# Patient Record
Sex: MALE | Race: WHITE | NOT HISPANIC OR LATINO | Employment: FULL TIME | URBAN - METROPOLITAN AREA
[De-identification: names, ages, dates, MRNs, and addresses within clinical notes are randomized per-mention and may not be internally consistent; named-entity substitution may affect disease eponyms.]

---

## 2023-03-31 DIAGNOSIS — K21.00 GASTRO-ESOPHAGEAL REFLUX DISEASE WITH ESOPHAGITIS, WITHOUT BLEEDING: ICD-10-CM

## 2023-03-31 RX ORDER — DEXLANSOPRAZOLE 60 MG/1
CAPSULE, DELAYED RELEASE ORAL
Qty: 90 CAPSULE | Refills: 1 | Status: SHIPPED | OUTPATIENT
Start: 2023-03-31 | End: 2023-08-23

## 2023-07-24 DIAGNOSIS — K21.9 GASTROESOPHAGEAL REFLUX DISEASE WITHOUT ESOPHAGITIS: Primary | ICD-10-CM

## 2023-07-24 RX ORDER — PANTOPRAZOLE SODIUM 40 MG/1
40 TABLET, DELAYED RELEASE ORAL DAILY
Qty: 30 TABLET | Refills: 1 | Status: SHIPPED | OUTPATIENT
Start: 2023-07-24 | End: 2023-08-23 | Stop reason: SDUPTHER

## 2023-08-21 PROBLEM — R07.89 ATYPICAL CHEST PAIN: Status: ACTIVE | Noted: 2023-08-21

## 2023-08-21 PROBLEM — K20.90 ACUTE ESOPHAGITIS: Status: ACTIVE | Noted: 2023-08-21

## 2023-08-21 PROBLEM — G56.02 CARPAL TUNNEL SYNDROME OF LEFT WRIST: Status: ACTIVE | Noted: 2023-08-21

## 2023-08-21 PROBLEM — E55.9 VITAMIN D DEFICIENCY: Status: ACTIVE | Noted: 2023-08-21

## 2023-08-21 PROBLEM — D51.0 PERNICIOUS ANEMIA: Status: ACTIVE | Noted: 2023-08-21

## 2023-08-21 PROBLEM — R73.09 ABNORMAL GLUCOSE: Status: ACTIVE | Noted: 2023-08-21

## 2023-08-21 PROBLEM — I27.20 PULMONARY HYPERTENSION (MULTI): Status: ACTIVE | Noted: 2023-08-21

## 2023-08-21 PROBLEM — E87.5 HYPERKALEMIA: Status: ACTIVE | Noted: 2023-08-21

## 2023-08-21 PROBLEM — I50.30 DIASTOLIC HEART FAILURE (MULTI): Status: ACTIVE | Noted: 2023-08-21

## 2023-08-21 PROBLEM — R26.81 UNSTEADY GAIT: Status: ACTIVE | Noted: 2023-08-21

## 2023-08-21 PROBLEM — K21.00 GASTROESOPHAGEAL REFLUX DISEASE WITH ESOPHAGITIS: Status: ACTIVE | Noted: 2023-08-21

## 2023-08-21 PROBLEM — M51.26 LUMBAR DISC HERNIATION: Status: ACTIVE | Noted: 2023-08-21

## 2023-08-21 PROBLEM — R59.1 LYMPHADENOPATHY: Status: ACTIVE | Noted: 2023-08-21

## 2023-08-21 PROBLEM — R60.0 BILATERAL LEG EDEMA: Status: ACTIVE | Noted: 2023-08-21

## 2023-08-21 PROBLEM — M51.36 DDD (DEGENERATIVE DISC DISEASE), LUMBAR: Status: ACTIVE | Noted: 2023-08-21

## 2023-08-21 PROBLEM — J43.9 PULMONARY EMPHYSEMA (MULTI): Status: ACTIVE | Noted: 2023-08-21

## 2023-08-21 PROBLEM — R29.898 WEAKNESS OF RIGHT LEG: Status: ACTIVE | Noted: 2023-08-21

## 2023-08-21 PROBLEM — S29.011A STRAIN OF RIGHT PECTORALIS MUSCLE: Status: ACTIVE | Noted: 2023-08-21

## 2023-08-21 PROBLEM — M10.9 GOUT: Status: ACTIVE | Noted: 2023-08-21

## 2023-08-21 PROBLEM — K40.20 BILATERAL INGUINAL HERNIA: Status: ACTIVE | Noted: 2023-08-21

## 2023-08-21 PROBLEM — G47.30 SLEEP-DISORDERED BREATHING: Status: ACTIVE | Noted: 2023-08-21

## 2023-08-21 PROBLEM — M51.369 DDD (DEGENERATIVE DISC DISEASE), LUMBAR: Status: ACTIVE | Noted: 2023-08-21

## 2023-08-21 PROBLEM — I10 BENIGN ESSENTIAL HYPERTENSION: Status: ACTIVE | Noted: 2023-08-21

## 2023-08-21 PROBLEM — E78.2 MIXED HYPERLIPIDEMIA: Status: ACTIVE | Noted: 2023-08-21

## 2023-08-21 PROBLEM — R73.9 HYPERGLYCEMIA: Status: ACTIVE | Noted: 2023-08-21

## 2023-08-21 RX ORDER — ALBUTEROL SULFATE 90 UG/1
1 AEROSOL, METERED RESPIRATORY (INHALATION) EVERY 4 HOURS PRN
COMMUNITY
Start: 2019-03-20

## 2023-08-21 RX ORDER — ALLOPURINOL 100 MG/1
1 TABLET ORAL DAILY
COMMUNITY
Start: 2022-09-14 | End: 2023-08-23 | Stop reason: ALTCHOICE

## 2023-08-21 RX ORDER — ATORVASTATIN CALCIUM 40 MG/1
40 TABLET, FILM COATED ORAL DAILY
COMMUNITY
End: 2023-08-23 | Stop reason: SDUPTHER

## 2023-08-21 RX ORDER — BUDESONIDE AND FORMOTEROL FUMARATE DIHYDRATE 80; 4.5 UG/1; UG/1
2 AEROSOL RESPIRATORY (INHALATION) 2 TIMES DAILY
COMMUNITY
Start: 2019-03-20 | End: 2023-12-05

## 2023-08-23 ENCOUNTER — OFFICE VISIT (OUTPATIENT)
Dept: PRIMARY CARE | Facility: CLINIC | Age: 58
End: 2023-08-23
Payer: COMMERCIAL

## 2023-08-23 VITALS
RESPIRATION RATE: 16 BRPM | BODY MASS INDEX: 27.62 KG/M2 | TEMPERATURE: 98.2 F | OXYGEN SATURATION: 96 % | HEART RATE: 67 BPM | DIASTOLIC BLOOD PRESSURE: 64 MMHG | HEIGHT: 67 IN | SYSTOLIC BLOOD PRESSURE: 130 MMHG | WEIGHT: 176 LBS

## 2023-08-23 DIAGNOSIS — I10 BENIGN ESSENTIAL HYPERTENSION: ICD-10-CM

## 2023-08-23 DIAGNOSIS — M1A.0710 IDIOPATHIC CHRONIC GOUT OF RIGHT ANKLE WITHOUT TOPHUS: ICD-10-CM

## 2023-08-23 DIAGNOSIS — K21.9 GASTROESOPHAGEAL REFLUX DISEASE WITHOUT ESOPHAGITIS: ICD-10-CM

## 2023-08-23 DIAGNOSIS — R73.03 PRE-DIABETES: ICD-10-CM

## 2023-08-23 DIAGNOSIS — Z12.11 COLON CANCER SCREENING: ICD-10-CM

## 2023-08-23 DIAGNOSIS — Z00.00 ANNUAL PHYSICAL EXAM: ICD-10-CM

## 2023-08-23 DIAGNOSIS — K21.00 GASTROESOPHAGEAL REFLUX DISEASE WITH ESOPHAGITIS WITHOUT HEMORRHAGE: ICD-10-CM

## 2023-08-23 DIAGNOSIS — E78.2 MIXED HYPERLIPIDEMIA: ICD-10-CM

## 2023-08-23 DIAGNOSIS — I50.32 CHRONIC DIASTOLIC HEART FAILURE (MULTI): ICD-10-CM

## 2023-08-23 DIAGNOSIS — J43.1 PANLOBULAR EMPHYSEMA (MULTI): ICD-10-CM

## 2023-08-23 DIAGNOSIS — Z23 NEED FOR VACCINATION: ICD-10-CM

## 2023-08-23 PROBLEM — K20.90 ACUTE ESOPHAGITIS: Status: RESOLVED | Noted: 2023-08-21 | Resolved: 2023-08-23

## 2023-08-23 PROBLEM — R59.1 LYMPHADENOPATHY: Status: RESOLVED | Noted: 2023-08-21 | Resolved: 2023-08-23

## 2023-08-23 PROBLEM — R29.898 WEAKNESS OF RIGHT LEG: Status: RESOLVED | Noted: 2023-08-21 | Resolved: 2023-08-23

## 2023-08-23 PROBLEM — R73.9 HYPERGLYCEMIA: Status: RESOLVED | Noted: 2023-08-21 | Resolved: 2023-08-23

## 2023-08-23 PROBLEM — R73.09 ABNORMAL GLUCOSE: Status: RESOLVED | Noted: 2023-08-21 | Resolved: 2023-08-23

## 2023-08-23 PROBLEM — R07.89 ATYPICAL CHEST PAIN: Status: RESOLVED | Noted: 2023-08-21 | Resolved: 2023-08-23

## 2023-08-23 PROBLEM — R60.0 BILATERAL LEG EDEMA: Status: RESOLVED | Noted: 2023-08-21 | Resolved: 2023-08-23

## 2023-08-23 PROBLEM — E87.5 HYPERKALEMIA: Status: RESOLVED | Noted: 2023-08-21 | Resolved: 2023-08-23

## 2023-08-23 PROBLEM — R26.81 UNSTEADY GAIT: Status: RESOLVED | Noted: 2023-08-21 | Resolved: 2023-08-23

## 2023-08-23 PROBLEM — G56.02 CARPAL TUNNEL SYNDROME OF LEFT WRIST: Status: RESOLVED | Noted: 2023-08-21 | Resolved: 2023-08-23

## 2023-08-23 PROBLEM — S29.011A STRAIN OF RIGHT PECTORALIS MUSCLE: Status: RESOLVED | Noted: 2023-08-21 | Resolved: 2023-08-23

## 2023-08-23 PROCEDURE — 90677 PCV20 VACCINE IM: CPT | Performed by: FAMILY MEDICINE

## 2023-08-23 PROCEDURE — 90471 IMMUNIZATION ADMIN: CPT | Performed by: FAMILY MEDICINE

## 2023-08-23 PROCEDURE — 3078F DIAST BP <80 MM HG: CPT | Performed by: FAMILY MEDICINE

## 2023-08-23 PROCEDURE — 99214 OFFICE O/P EST MOD 30 MIN: CPT | Performed by: FAMILY MEDICINE

## 2023-08-23 PROCEDURE — 1036F TOBACCO NON-USER: CPT | Performed by: FAMILY MEDICINE

## 2023-08-23 PROCEDURE — 99396 PREV VISIT EST AGE 40-64: CPT | Performed by: FAMILY MEDICINE

## 2023-08-23 PROCEDURE — 94010 BREATHING CAPACITY TEST: CPT | Performed by: FAMILY MEDICINE

## 2023-08-23 PROCEDURE — 3075F SYST BP GE 130 - 139MM HG: CPT | Performed by: FAMILY MEDICINE

## 2023-08-23 RX ORDER — ATORVASTATIN CALCIUM 40 MG/1
40 TABLET, FILM COATED ORAL DAILY
Qty: 90 TABLET | Refills: 1 | Status: SHIPPED | OUTPATIENT
Start: 2023-08-23

## 2023-08-23 RX ORDER — PANTOPRAZOLE SODIUM 40 MG/1
40 TABLET, DELAYED RELEASE ORAL DAILY
Qty: 30 TABLET | Refills: 1 | Status: SHIPPED | OUTPATIENT
Start: 2023-08-23 | End: 2023-12-05

## 2023-08-23 RX ORDER — ALLOPURINOL 100 MG/1
100 TABLET ORAL DAILY
Qty: 30 TABLET | Refills: 1 | Status: SHIPPED | OUTPATIENT
Start: 2023-08-23 | End: 2023-11-03

## 2023-08-23 ASSESSMENT — PATIENT HEALTH QUESTIONNAIRE - PHQ9
8. MOVING OR SPEAKING SO SLOWLY THAT OTHER PEOPLE COULD HAVE NOTICED. OR THE OPPOSITE, BEING SO FIGETY OR RESTLESS THAT YOU HAVE BEEN MOVING AROUND A LOT MORE THAN USUAL: NOT AT ALL
2. FEELING DOWN, DEPRESSED OR HOPELESS: NOT AT ALL
SUM OF ALL RESPONSES TO PHQ9 QUESTIONS 1 AND 2: 0
6. FEELING BAD ABOUT YOURSELF - OR THAT YOU ARE A FAILURE OR HAVE LET YOURSELF OR YOUR FAMILY DOWN: NOT AT ALL
5. POOR APPETITE OR OVEREATING: NOT AT ALL
4. FEELING TIRED OR HAVING LITTLE ENERGY: MORE THAN HALF THE DAYS
10. IF YOU CHECKED OFF ANY PROBLEMS, HOW DIFFICULT HAVE THESE PROBLEMS MADE IT FOR YOU TO DO YOUR WORK, TAKE CARE OF THINGS AT HOME, OR GET ALONG WITH OTHER PEOPLE: NOT DIFFICULT AT ALL
3. TROUBLE FALLING OR STAYING ASLEEP OR SLEEPING TOO MUCH: NOT AT ALL
7. TROUBLE CONCENTRATING ON THINGS, SUCH AS READING THE NEWSPAPER OR WATCHING TELEVISION: NOT AT ALL
9. THOUGHTS THAT YOU WOULD BE BETTER OFF DEAD, OR OF HURTING YOURSELF: NOT AT ALL
SUM OF ALL RESPONSES TO PHQ QUESTIONS 1-9: 2
1. LITTLE INTEREST OR PLEASURE IN DOING THINGS: NOT AT ALL

## 2023-08-23 NOTE — ASSESSMENT & PLAN NOTE
Stable.  Normal spirometry.  Continue Symbicort twice daily and as needed use of albuterol.  Recommending referral back to pulmonology, patient declined

## 2023-08-23 NOTE — ASSESSMENT & PLAN NOTE
Stable without meds.  Patient declined medication at this time.  Begin home blood pressure monitoring.  Referring back to cardiology.  Recheck in 3 months

## 2023-08-23 NOTE — ASSESSMENT & PLAN NOTE
Schedule fasting labs.  Checking PSA.  Schedule colonoscopy.  Increase physical activity.  Follow a Mediterranean diet.  Update pneumonia vaccine.  Declined tetanus

## 2023-08-23 NOTE — ASSESSMENT & PLAN NOTE
Stable without meds?   Schedule echocardiogram   Referring back to cardiology for further assessment.

## 2023-08-23 NOTE — PROGRESS NOTES
"Subjective   Patient ID: Russel Black is a 57 y.o. male who presents for Annual Exam.    Here for CPE    Has been on vacation at home this week, fixing up his house getting maintenance completed.     Feeling well in general  Wakes frequently at night, several times to go the BR  Does not seem abnormal, denies urine issues, pain, burning or urgency.  Has been for years.  Breathing is fair, using MDI symbicort daily  Still getting SOB with ambulating long distances  Working at [employer], shipping and moving packages.     Has not seen his cardiologist in several years  Due for colonoscopy and GI visit, declines follow up    Due for tdap and pneumonia vaccines               Review of Systems    Objective   /64   Pulse 67   Temp 36.8 °C (98.2 °F)   Resp 16   Ht 1.689 m (5' 6.5\")   Wt 79.8 kg (176 lb)   SpO2 96%   BMI 27.98 kg/m²     Physical Exam  Vitals and nursing note reviewed.   Constitutional:       Appearance: Normal appearance.   HENT:      Head: Normocephalic.      Right Ear: Tympanic membrane and ear canal normal.      Left Ear: Tympanic membrane and ear canal normal.      Nose: Nose normal.      Mouth/Throat:      Mouth: Mucous membranes are moist.   Eyes:      Extraocular Movements: Extraocular movements intact.      Conjunctiva/sclera: Conjunctivae normal.      Pupils: Pupils are equal, round, and reactive to light.   Cardiovascular:      Rate and Rhythm: Normal rate and regular rhythm.      Pulses: Normal pulses.      Heart sounds: Normal heart sounds.   Pulmonary:      Effort: Pulmonary effort is normal.      Breath sounds: Normal breath sounds.   Musculoskeletal:         General: Normal range of motion.      Cervical back: Normal range of motion.   Skin:     General: Skin is warm and dry.   Neurological:      General: No focal deficit present.      Mental Status: He is alert and oriented to person, place, and time.   Psychiatric:         Mood and Affect: Mood normal.         Behavior: " Behavior normal.         Thought Content: Thought content normal.         Judgment: Judgment normal.         Assessment/Plan   Problem List Items Addressed This Visit       Benign essential hypertension     Stable without meds.  Patient declined medication at this time.  Begin home blood pressure monitoring.  Referring back to cardiology.  Recheck in 3 months         Diastolic heart failure (CMS/HCC)     Stable without meds?   Schedule echocardiogram   Referring back to cardiology for further assessment.           Relevant Orders    Referral to Cardiology    Transthoracic Echo (TTE) Complete    Gastroesophageal reflux disease with esophagitis     Stable on present dose of meds.  Referring back to GI for continued assessment and colonoscopy         Gout     Stable with as needed use of medication.  Continue to monitor         Relevant Medications    allopurinol (Zyloprim) 100 mg tablet    Mixed hyperlipidemia     Checking fasting lipid profile and adjust meds accordingly.  Reviewed diet.  Recheck in 3 months         Relevant Medications    atorvastatin (Lipitor) 40 mg tablet    Pulmonary emphysema (CMS/HCC)     Stable.  Normal spirometry.  Continue Symbicort twice daily and as needed use of albuterol.  Recommending referral back to pulmonology, patient declined         Relevant Orders    Pulmonary function testing (Clinic Performed)    Breathing capacity test    Pre-diabetes     Uncontrolled  Checking fasting sugar and treat accordingly         Annual physical exam - Primary     Schedule fasting labs.  Checking PSA.  Schedule colonoscopy.  Increase physical activity.  Follow a Mediterranean diet.  Update pneumonia vaccine.  Declined tetanus           Relevant Orders    CBC and Auto Differential    Comprehensive Metabolic Panel    Lipid Panel    Prostate Specific Antigen, Screen    Pneumococcal conjugate vaccine, 20-valent, adult (PREVNAR 20) (Completed)     Other Visit Diagnoses       Colon cancer screening         Relevant Orders    Colonoscopy Screening    Gastroesophageal reflux disease without esophagitis        Relevant Medications    pantoprazole (ProtoNix) 40 mg EC tablet    Need for vaccination        Relevant Orders    Pneumococcal conjugate vaccine, 20-valent, adult (PREVNAR 20) (Completed)

## 2023-08-28 ENCOUNTER — LAB (OUTPATIENT)
Dept: LAB | Facility: LAB | Age: 58
End: 2023-08-28
Payer: COMMERCIAL

## 2023-08-28 DIAGNOSIS — Z00.00 ANNUAL PHYSICAL EXAM: ICD-10-CM

## 2023-08-28 LAB
ALANINE AMINOTRANSFERASE (SGPT) (U/L) IN SER/PLAS: 17 U/L (ref 10–52)
ALBUMIN (G/DL) IN SER/PLAS: 4.5 G/DL (ref 3.4–5)
ALKALINE PHOSPHATASE (U/L) IN SER/PLAS: 65 U/L (ref 33–120)
ANION GAP IN SER/PLAS: 14 MMOL/L (ref 10–20)
ASPARTATE AMINOTRANSFERASE (SGOT) (U/L) IN SER/PLAS: 16 U/L (ref 9–39)
BASOPHILS (10*3/UL) IN BLOOD BY AUTOMATED COUNT: 0.06 X10E9/L (ref 0–0.1)
BASOPHILS/100 LEUKOCYTES IN BLOOD BY AUTOMATED COUNT: 0.8 % (ref 0–2)
BILIRUBIN TOTAL (MG/DL) IN SER/PLAS: 0.7 MG/DL (ref 0–1.2)
CALCIUM (MG/DL) IN SER/PLAS: 9.6 MG/DL (ref 8.6–10.6)
CARBON DIOXIDE, TOTAL (MMOL/L) IN SER/PLAS: 25 MMOL/L (ref 21–32)
CHLORIDE (MMOL/L) IN SER/PLAS: 106 MMOL/L (ref 98–107)
CHOLESTEROL (MG/DL) IN SER/PLAS: 180 MG/DL (ref 0–199)
CHOLESTEROL IN HDL (MG/DL) IN SER/PLAS: 46.8 MG/DL
CHOLESTEROL/HDL RATIO: 3.8
CREATININE (MG/DL) IN SER/PLAS: 1 MG/DL (ref 0.5–1.3)
EOSINOPHILS (10*3/UL) IN BLOOD BY AUTOMATED COUNT: 0.44 X10E9/L (ref 0–0.7)
EOSINOPHILS/100 LEUKOCYTES IN BLOOD BY AUTOMATED COUNT: 6 % (ref 0–6)
ERYTHROCYTE DISTRIBUTION WIDTH (RATIO) BY AUTOMATED COUNT: 11.8 % (ref 11.5–14.5)
ERYTHROCYTE MEAN CORPUSCULAR HEMOGLOBIN CONCENTRATION (G/DL) BY AUTOMATED: 32.7 G/DL (ref 32–36)
ERYTHROCYTE MEAN CORPUSCULAR VOLUME (FL) BY AUTOMATED COUNT: 93 FL (ref 80–100)
ERYTHROCYTES (10*6/UL) IN BLOOD BY AUTOMATED COUNT: 5.09 X10E12/L (ref 4.5–5.9)
GFR MALE: 89 ML/MIN/1.73M2
GLUCOSE (MG/DL) IN SER/PLAS: 101 MG/DL (ref 74–99)
HEMATOCRIT (%) IN BLOOD BY AUTOMATED COUNT: 47.4 % (ref 41–52)
HEMOGLOBIN (G/DL) IN BLOOD: 15.5 G/DL (ref 13.5–17.5)
IMMATURE GRANULOCYTES/100 LEUKOCYTES IN BLOOD BY AUTOMATED COUNT: 0.3 % (ref 0–0.9)
LDL: 122 MG/DL (ref 0–99)
LEUKOCYTES (10*3/UL) IN BLOOD BY AUTOMATED COUNT: 7.4 X10E9/L (ref 4.4–11.3)
LYMPHOCYTES (10*3/UL) IN BLOOD BY AUTOMATED COUNT: 1.45 X10E9/L (ref 1.2–4.8)
LYMPHOCYTES/100 LEUKOCYTES IN BLOOD BY AUTOMATED COUNT: 19.6 % (ref 13–44)
MONOCYTES (10*3/UL) IN BLOOD BY AUTOMATED COUNT: 0.59 X10E9/L (ref 0.1–1)
MONOCYTES/100 LEUKOCYTES IN BLOOD BY AUTOMATED COUNT: 8 % (ref 2–10)
NEUTROPHILS (10*3/UL) IN BLOOD BY AUTOMATED COUNT: 4.83 X10E9/L (ref 1.2–7.7)
NEUTROPHILS/100 LEUKOCYTES IN BLOOD BY AUTOMATED COUNT: 65.3 % (ref 40–80)
NRBC (PER 100 WBCS) BY AUTOMATED COUNT: 0 /100 WBC (ref 0–0)
PLATELETS (10*3/UL) IN BLOOD AUTOMATED COUNT: 262 X10E9/L (ref 150–450)
POTASSIUM (MMOL/L) IN SER/PLAS: 4.4 MMOL/L (ref 3.5–5.3)
PROSTATE SPECIFIC ANTIGEN,SCREEN: 1.07 NG/ML (ref 0–4)
PROTEIN TOTAL: 7.3 G/DL (ref 6.4–8.2)
SODIUM (MMOL/L) IN SER/PLAS: 141 MMOL/L (ref 136–145)
TRIGLYCERIDE (MG/DL) IN SER/PLAS: 56 MG/DL (ref 0–149)
UREA NITROGEN (MG/DL) IN SER/PLAS: 10 MG/DL (ref 6–23)
VLDL: 11 MG/DL (ref 0–40)

## 2023-08-28 PROCEDURE — 84153 ASSAY OF PSA TOTAL: CPT

## 2023-08-28 PROCEDURE — 85025 COMPLETE CBC W/AUTO DIFF WBC: CPT

## 2023-08-28 PROCEDURE — 80053 COMPREHEN METABOLIC PANEL: CPT

## 2023-08-28 PROCEDURE — 80061 LIPID PANEL: CPT

## 2023-08-28 PROCEDURE — 36415 COLL VENOUS BLD VENIPUNCTURE: CPT

## 2023-11-15 ENCOUNTER — APPOINTMENT (OUTPATIENT)
Dept: PRIMARY CARE | Facility: CLINIC | Age: 58
End: 2023-11-15
Payer: COMMERCIAL

## 2023-12-05 DIAGNOSIS — J43.9 EMPHYSEMA, UNSPECIFIED (MULTI): ICD-10-CM

## 2023-12-05 DIAGNOSIS — K21.9 GASTROESOPHAGEAL REFLUX DISEASE WITHOUT ESOPHAGITIS: ICD-10-CM

## 2023-12-05 RX ORDER — BUDESONIDE AND FORMOTEROL FUMARATE DIHYDRATE 80; 4.5 UG/1; UG/1
2 AEROSOL RESPIRATORY (INHALATION) 2 TIMES DAILY
Qty: 10.2 EACH | Refills: 0 | Status: SHIPPED | OUTPATIENT
Start: 2023-12-05 | End: 2024-01-02

## 2023-12-05 RX ORDER — PANTOPRAZOLE SODIUM 40 MG/1
40 TABLET, DELAYED RELEASE ORAL DAILY
Qty: 30 TABLET | Refills: 0 | Status: SHIPPED | OUTPATIENT
Start: 2023-12-05 | End: 2024-01-02

## 2024-01-01 DIAGNOSIS — M1A.0710 IDIOPATHIC CHRONIC GOUT OF RIGHT ANKLE WITHOUT TOPHUS: ICD-10-CM

## 2024-01-01 DIAGNOSIS — K21.9 GASTROESOPHAGEAL REFLUX DISEASE WITHOUT ESOPHAGITIS: ICD-10-CM

## 2024-01-01 DIAGNOSIS — J43.9 EMPHYSEMA, UNSPECIFIED (MULTI): ICD-10-CM

## 2024-01-02 RX ORDER — BUDESONIDE AND FORMOTEROL FUMARATE DIHYDRATE 80; 4.5 UG/1; UG/1
2 AEROSOL RESPIRATORY (INHALATION) 2 TIMES DAILY
Qty: 10.2 EACH | Refills: 0 | Status: SHIPPED | OUTPATIENT
Start: 2024-01-02

## 2024-01-02 RX ORDER — ALLOPURINOL 100 MG/1
100 TABLET ORAL DAILY
Qty: 30 TABLET | Refills: 0 | Status: SHIPPED | OUTPATIENT
Start: 2024-01-02

## 2024-01-02 RX ORDER — PANTOPRAZOLE SODIUM 40 MG/1
40 TABLET, DELAYED RELEASE ORAL DAILY
Qty: 30 TABLET | Refills: 0 | Status: SHIPPED | OUTPATIENT
Start: 2024-01-02

## 2024-01-29 DIAGNOSIS — J43.9 EMPHYSEMA, UNSPECIFIED (MULTI): ICD-10-CM

## 2024-01-29 DIAGNOSIS — M1A.0710 IDIOPATHIC CHRONIC GOUT OF RIGHT ANKLE WITHOUT TOPHUS: ICD-10-CM

## 2024-01-29 DIAGNOSIS — K21.9 GASTROESOPHAGEAL REFLUX DISEASE WITHOUT ESOPHAGITIS: ICD-10-CM

## 2024-01-29 RX ORDER — PANTOPRAZOLE SODIUM 40 MG/1
40 TABLET, DELAYED RELEASE ORAL DAILY
Qty: 30 TABLET | Refills: 0 | OUTPATIENT
Start: 2024-01-29

## 2024-01-29 RX ORDER — ALLOPURINOL 100 MG/1
100 TABLET ORAL DAILY
Qty: 30 TABLET | Refills: 0 | OUTPATIENT
Start: 2024-01-29

## 2024-01-29 RX ORDER — BUDESONIDE AND FORMOTEROL FUMARATE DIHYDRATE 80; 4.5 UG/1; UG/1
2 AEROSOL RESPIRATORY (INHALATION) 2 TIMES DAILY
Qty: 10.2 EACH | Refills: 0 | OUTPATIENT
Start: 2024-01-29

## 2024-02-25 DIAGNOSIS — E78.2 MIXED HYPERLIPIDEMIA: ICD-10-CM

## 2024-02-26 RX ORDER — ATORVASTATIN CALCIUM 40 MG/1
40 TABLET, FILM COATED ORAL DAILY
Qty: 90 TABLET | Refills: 1 | OUTPATIENT
Start: 2024-02-26

## 2024-06-24 ENCOUNTER — APPOINTMENT (OUTPATIENT)
Dept: RADIOLOGY | Facility: HOSPITAL | Age: 59
End: 2024-06-24
Payer: COMMERCIAL

## 2024-06-24 ENCOUNTER — HOSPITAL ENCOUNTER (EMERGENCY)
Facility: HOSPITAL | Age: 59
Discharge: HOME | End: 2024-06-24
Attending: EMERGENCY MEDICINE
Payer: COMMERCIAL

## 2024-06-24 ENCOUNTER — APPOINTMENT (OUTPATIENT)
Dept: CARDIOLOGY | Facility: HOSPITAL | Age: 59
End: 2024-06-24
Payer: COMMERCIAL

## 2024-06-24 VITALS
BODY MASS INDEX: 26.68 KG/M2 | HEIGHT: 67 IN | WEIGHT: 170 LBS | TEMPERATURE: 99 F | DIASTOLIC BLOOD PRESSURE: 82 MMHG | OXYGEN SATURATION: 97 % | HEART RATE: 88 BPM | SYSTOLIC BLOOD PRESSURE: 110 MMHG | RESPIRATION RATE: 16 BRPM

## 2024-06-24 DIAGNOSIS — U07.1 COVID: Primary | ICD-10-CM

## 2024-06-24 LAB
ANION GAP SERPL CALC-SCNC: 15 MMOL/L (ref 10–20)
BASOPHILS # BLD AUTO: 0.03 X10*3/UL (ref 0–0.1)
BASOPHILS NFR BLD AUTO: 0.2 %
BUN SERPL-MCNC: 10 MG/DL (ref 6–23)
CALCIUM SERPL-MCNC: 9.2 MG/DL (ref 8.6–10.3)
CARDIAC TROPONIN I PNL SERPL HS: <3 NG/L (ref 0–20)
CHLORIDE SERPL-SCNC: 102 MMOL/L (ref 98–107)
CO2 SERPL-SCNC: 23 MMOL/L (ref 21–32)
CREAT SERPL-MCNC: 1.14 MG/DL (ref 0.5–1.3)
EGFRCR SERPLBLD CKD-EPI 2021: 75 ML/MIN/1.73M*2
EOSINOPHIL # BLD AUTO: 0.05 X10*3/UL (ref 0–0.7)
EOSINOPHIL NFR BLD AUTO: 0.4 %
ERYTHROCYTE [DISTWIDTH] IN BLOOD BY AUTOMATED COUNT: 12.3 % (ref 11.5–14.5)
FLUAV RNA RESP QL NAA+PROBE: NOT DETECTED
FLUBV RNA RESP QL NAA+PROBE: NOT DETECTED
GLUCOSE SERPL-MCNC: 103 MG/DL (ref 74–99)
HCT VFR BLD AUTO: 44.4 % (ref 41–52)
HGB BLD-MCNC: 14.5 G/DL (ref 13.5–17.5)
IMM GRANULOCYTES # BLD AUTO: 0.04 X10*3/UL (ref 0–0.7)
IMM GRANULOCYTES NFR BLD AUTO: 0.3 % (ref 0–0.9)
LYMPHOCYTES # BLD AUTO: 1.13 X10*3/UL (ref 1.2–4.8)
LYMPHOCYTES NFR BLD AUTO: 8.5 %
MCH RBC QN AUTO: 28 PG (ref 26–34)
MCHC RBC AUTO-ENTMCNC: 32.7 G/DL (ref 32–36)
MCV RBC AUTO: 86 FL (ref 80–100)
MONOCYTES # BLD AUTO: 0.86 X10*3/UL (ref 0.1–1)
MONOCYTES NFR BLD AUTO: 6.5 %
NEUTROPHILS # BLD AUTO: 11.14 X10*3/UL (ref 1.2–7.7)
NEUTROPHILS NFR BLD AUTO: 84.1 %
NRBC BLD-RTO: 0 /100 WBCS (ref 0–0)
PLATELET # BLD AUTO: 288 X10*3/UL (ref 150–450)
POTASSIUM SERPL-SCNC: 4.1 MMOL/L (ref 3.5–5.3)
RBC # BLD AUTO: 5.17 X10*6/UL (ref 4.5–5.9)
S PYO DNA THROAT QL NAA+PROBE: NOT DETECTED
SARS-COV-2 RNA RESP QL NAA+PROBE: DETECTED
SODIUM SERPL-SCNC: 136 MMOL/L (ref 136–145)
WBC # BLD AUTO: 13.3 X10*3/UL (ref 4.4–11.3)

## 2024-06-24 PROCEDURE — 85025 COMPLETE CBC W/AUTO DIFF WBC: CPT | Performed by: EMERGENCY MEDICINE

## 2024-06-24 PROCEDURE — 87636 SARSCOV2 & INF A&B AMP PRB: CPT | Performed by: EMERGENCY MEDICINE

## 2024-06-24 PROCEDURE — 93005 ELECTROCARDIOGRAM TRACING: CPT

## 2024-06-24 PROCEDURE — 99283 EMERGENCY DEPT VISIT LOW MDM: CPT | Mod: 25

## 2024-06-24 PROCEDURE — 80048 BASIC METABOLIC PNL TOTAL CA: CPT | Performed by: EMERGENCY MEDICINE

## 2024-06-24 PROCEDURE — 87651 STREP A DNA AMP PROBE: CPT | Performed by: EMERGENCY MEDICINE

## 2024-06-24 PROCEDURE — 71046 X-RAY EXAM CHEST 2 VIEWS: CPT

## 2024-06-24 PROCEDURE — 84484 ASSAY OF TROPONIN QUANT: CPT | Performed by: EMERGENCY MEDICINE

## 2024-06-24 PROCEDURE — 71046 X-RAY EXAM CHEST 2 VIEWS: CPT | Mod: FOREIGN READ | Performed by: STUDENT IN AN ORGANIZED HEALTH CARE EDUCATION/TRAINING PROGRAM

## 2024-06-24 PROCEDURE — 2500000001 HC RX 250 WO HCPCS SELF ADMINISTERED DRUGS (ALT 637 FOR MEDICARE OP): Performed by: EMERGENCY MEDICINE

## 2024-06-24 PROCEDURE — 36415 COLL VENOUS BLD VENIPUNCTURE: CPT | Performed by: EMERGENCY MEDICINE

## 2024-06-24 RX ORDER — NIRMATRELVIR AND RITONAVIR 300-100 MG
3 KIT ORAL 2 TIMES DAILY
Qty: 30 TABLET | Refills: 0 | Status: SHIPPED | OUTPATIENT
Start: 2024-06-24 | End: 2024-06-29

## 2024-06-24 RX ORDER — TRIPROLIDINE/PSEUDOEPHEDRINE 2.5MG-60MG
400 TABLET ORAL ONCE
Status: COMPLETED | OUTPATIENT
Start: 2024-06-24 | End: 2024-06-24

## 2024-06-24 ASSESSMENT — PAIN - FUNCTIONAL ASSESSMENT: PAIN_FUNCTIONAL_ASSESSMENT: 0-10

## 2024-06-24 ASSESSMENT — COLUMBIA-SUICIDE SEVERITY RATING SCALE - C-SSRS
6. HAVE YOU EVER DONE ANYTHING, STARTED TO DO ANYTHING, OR PREPARED TO DO ANYTHING TO END YOUR LIFE?: NO
1. IN THE PAST MONTH, HAVE YOU WISHED YOU WERE DEAD OR WISHED YOU COULD GO TO SLEEP AND NOT WAKE UP?: NO
2. HAVE YOU ACTUALLY HAD ANY THOUGHTS OF KILLING YOURSELF?: NO

## 2024-06-24 ASSESSMENT — PAIN SCALES - GENERAL: PAINLEVEL_OUTOF10: 0 - NO PAIN

## 2024-06-24 NOTE — Clinical Note
Russel Black was seen and treated in our emergency department on 6/24/2024.  He may return to work on 06/29/2024.  + covid     If you have any questions or concerns, please don't hesitate to call.      Tim Gamboa MD

## 2024-06-25 LAB
ATRIAL RATE: 116 BPM
P AXIS: 27 DEGREES
P OFFSET: 194 MS
P ONSET: 156 MS
PR INTERVAL: 118 MS
Q ONSET: 215 MS
QRS COUNT: 19 BEATS
QRS DURATION: 82 MS
QT INTERVAL: 296 MS
QTC CALCULATION(BAZETT): 411 MS
QTC FREDERICIA: 369 MS
R AXIS: 2 DEGREES
T AXIS: 63 DEGREES
T OFFSET: 363 MS
VENTRICULAR RATE: 116 BPM

## 2024-06-25 NOTE — DISCHARGE INSTRUCTIONS
Please take the Paxlovid as prescribed to treat for your COVID.  Please use Tylenol and or Motrin as needed for fever and body aches.  Please stay well-hydrated.  Please turn ED for worsened difficulty breathing, productive cough, shortness of breath or any other concerning symptoms.

## 2024-06-25 NOTE — ED PROVIDER NOTES
HPI   Chief Complaint   Patient presents with   • Shortness of Breath   • Fever       This is a 58-year-old man with past medical history of of hypertension, diastolic heart failure, GERD, gout, hyperlipidemia, emphysema who does present with complaint of fever, chills, sore throat x 1 day.  No nausea or vomiting.  Positive associated cough.  No abdominal pain.  No nausea or vomiting or diarrhea.  No dysuria hematuria flank pain.  Tolerating p.o. normally.  Positive sick contacts with niece with similar symptoms.                        Krystin Coma Scale Score: 15                     Patient History   Past Medical History:   Diagnosis Date   • Carpal tunnel syndrome of left wrist 08/21/2023   • Other conditions influencing health status     Nephrolithiasis   • Personal history of other diseases of the circulatory system 10/23/2019    History of congestive heart failure   • Raynaud's syndrome without gangrene     Raynaud's disease     Past Surgical History:   Procedure Laterality Date   • CT ABDOMEN PELVIS ANGIOGRAM W AND/OR WO IV CONTRAST  9/20/2020    CT ABDOMEN PELVIS ANGIOGRAM W AND/OR WO IV CONTRAST 9/20/2020 [facility]   • LUNG LOBECTOMY  12/13/2013    Lung Lobectomy   • OTHER SURGICAL HISTORY  12/13/2013    Neuroplasty Right Thumb   • OTHER SURGICAL HISTORY  05/23/2018    Laparoscopy Repair Of Initial Inguinal Hernia     No family history on file.  Social History     Tobacco Use   • Smoking status: Never     Passive exposure: Never   • Smokeless tobacco: Never   Substance Use Topics   • Alcohol use: Not Currently   • Drug use: Not Currently       Physical Exam   ED Triage Vitals [06/24/24 1928]   Temperature Heart Rate Respirations BP   (!) 38 °C (100.4 °F) (!) 122 (!) 22 116/77      Pulse Ox Temp Source Heart Rate Source Patient Position   96 % Temporal -- --      BP Location FiO2 (%)     -- --       Physical Exam  Vitals and nursing note reviewed.   Constitutional:       Appearance: He is  well-developed and normal weight.   HENT:      Head: Normocephalic and atraumatic.      Mouth/Throat:      Mouth: Mucous membranes are moist.   Eyes:      Extraocular Movements: Extraocular movements intact.      Pupils: Pupils are equal, round, and reactive to light.   Cardiovascular:      Rate and Rhythm: Normal rate and regular rhythm.   Pulmonary:      Effort: Pulmonary effort is normal.      Breath sounds: Normal breath sounds.   Musculoskeletal:         General: Normal range of motion.      Cervical back: Normal range of motion and neck supple.   Skin:     General: Skin is warm and dry.      Capillary Refill: Capillary refill takes less than 2 seconds.   Neurological:      General: No focal deficit present.      Mental Status: He is alert and oriented to person, place, and time.   Psychiatric:         Mood and Affect: Mood normal.         Behavior: Behavior normal.         ED Course & MDM   Diagnoses as of 06/24/24 2238   COVID       Medical Decision Making  IV is placed and labs drawn including CBC, CMP, COVID, strep, troponin.  Flu a was negative.  Troponin was negative for ACS.  There was no acute kidney injury.  No metabolic and epic acidosis.  There was slightly elevated white blood cell count at 13.3.  SARS COVID was noted positive.  His chest x-ray was negative for acute pneumonia.  Following treatment with Motrin and p.o. fluids patient's vital signs do normalized.  He will be discharged home with Paxlovid given long risk factors plan to follow-up with outpatient provider.  Return precautions are discussed.    EKG interpreted by me showed normal sinus tachycardia rhythm at a rate of 116 with no acute ischemic changes.  No STEMI.  No A-fib.    Amount and/or Complexity of Data Reviewed  Labs: ordered. Decision-making details documented in ED Course.  Radiology: ordered. Decision-making details documented in ED Course.  ECG/medicine tests: ordered. Decision-making details documented in ED  Course.        Procedure  Procedures     Tim Gamboa MD  06/24/24 [id number]

## 2024-10-16 ENCOUNTER — HOSPITAL ENCOUNTER (OUTPATIENT)
Dept: RADIOLOGY | Facility: CLINIC | Age: 59
Discharge: HOME | End: 2024-10-16
Payer: COMMERCIAL

## 2024-10-16 ENCOUNTER — APPOINTMENT (OUTPATIENT)
Dept: PRIMARY CARE | Facility: CLINIC | Age: 59
End: 2024-10-16
Payer: COMMERCIAL

## 2024-10-16 ENCOUNTER — LAB (OUTPATIENT)
Dept: LAB | Facility: LAB | Age: 59
End: 2024-10-16
Payer: COMMERCIAL

## 2024-10-16 VITALS
WEIGHT: 183 LBS | HEIGHT: 67 IN | BODY MASS INDEX: 28.72 KG/M2 | DIASTOLIC BLOOD PRESSURE: 72 MMHG | HEART RATE: 79 BPM | OXYGEN SATURATION: 96 % | SYSTOLIC BLOOD PRESSURE: 106 MMHG | TEMPERATURE: 98 F

## 2024-10-16 DIAGNOSIS — I10 BENIGN ESSENTIAL HYPERTENSION: ICD-10-CM

## 2024-10-16 DIAGNOSIS — Z00.00 ANNUAL PHYSICAL EXAM: ICD-10-CM

## 2024-10-16 DIAGNOSIS — Z00.00 ANNUAL PHYSICAL EXAM: Primary | ICD-10-CM

## 2024-10-16 DIAGNOSIS — R73.03 PRE-DIABETES: ICD-10-CM

## 2024-10-16 DIAGNOSIS — F32.A DEPRESSION, UNSPECIFIED DEPRESSION TYPE: ICD-10-CM

## 2024-10-16 DIAGNOSIS — M1A.9XX0 CHRONIC GOUT WITHOUT TOPHUS, UNSPECIFIED CAUSE, UNSPECIFIED SITE: ICD-10-CM

## 2024-10-16 DIAGNOSIS — E78.2 MIXED HYPERLIPIDEMIA: ICD-10-CM

## 2024-10-16 DIAGNOSIS — I50.32 CHRONIC DIASTOLIC HEART FAILURE: ICD-10-CM

## 2024-10-16 DIAGNOSIS — I27.20 PULMONARY HYPERTENSION (MULTI): ICD-10-CM

## 2024-10-16 DIAGNOSIS — R13.12 OROPHARYNGEAL DYSPHAGIA: ICD-10-CM

## 2024-10-16 DIAGNOSIS — R06.02 SHORTNESS OF BREATH: ICD-10-CM

## 2024-10-16 DIAGNOSIS — R35.0 URINE FREQUENCY: ICD-10-CM

## 2024-10-16 DIAGNOSIS — J43.1 PANLOBULAR EMPHYSEMA (MULTI): ICD-10-CM

## 2024-10-16 DIAGNOSIS — K21.00 GASTROESOPHAGEAL REFLUX DISEASE WITH ESOPHAGITIS WITHOUT HEMORRHAGE: ICD-10-CM

## 2024-10-16 LAB
ALBUMIN SERPL BCP-MCNC: 4.3 G/DL (ref 3.4–5)
ALP SERPL-CCNC: 70 U/L (ref 33–120)
ALT SERPL W P-5'-P-CCNC: 18 U/L (ref 10–52)
ANION GAP SERPL CALC-SCNC: 11 MMOL/L (ref 10–20)
AST SERPL W P-5'-P-CCNC: 16 U/L (ref 9–39)
BASOPHILS # BLD AUTO: 0.03 X10*3/UL (ref 0–0.1)
BASOPHILS NFR BLD AUTO: 0.4 %
BILIRUB SERPL-MCNC: 0.5 MG/DL (ref 0–1.2)
BUN SERPL-MCNC: 10 MG/DL (ref 6–23)
CALCIUM SERPL-MCNC: 9.4 MG/DL (ref 8.6–10.6)
CHLORIDE SERPL-SCNC: 105 MMOL/L (ref 98–107)
CHOLEST SERPL-MCNC: 193 MG/DL (ref 0–199)
CHOLESTEROL/HDL RATIO: 4.3
CO2 SERPL-SCNC: 29 MMOL/L (ref 21–32)
CREAT SERPL-MCNC: 1.11 MG/DL (ref 0.5–1.3)
EGFRCR SERPLBLD CKD-EPI 2021: 78 ML/MIN/1.73M*2
EOSINOPHIL # BLD AUTO: 0.36 X10*3/UL (ref 0–0.7)
EOSINOPHIL NFR BLD AUTO: 5.1 %
ERYTHROCYTE [DISTWIDTH] IN BLOOD BY AUTOMATED COUNT: 13.2 % (ref 11.5–14.5)
GLUCOSE SERPL-MCNC: 106 MG/DL (ref 74–99)
HCT VFR BLD AUTO: 44.3 % (ref 41–52)
HDLC SERPL-MCNC: 45.4 MG/DL
HGB BLD-MCNC: 14.3 G/DL (ref 13.5–17.5)
IMM GRANULOCYTES # BLD AUTO: 0.01 X10*3/UL (ref 0–0.7)
IMM GRANULOCYTES NFR BLD AUTO: 0.1 % (ref 0–0.9)
LDLC SERPL CALC-MCNC: 130 MG/DL
LYMPHOCYTES # BLD AUTO: 1.45 X10*3/UL (ref 1.2–4.8)
LYMPHOCYTES NFR BLD AUTO: 20.7 %
MCH RBC QN AUTO: 28.7 PG (ref 26–34)
MCHC RBC AUTO-ENTMCNC: 32.3 G/DL (ref 32–36)
MCV RBC AUTO: 89 FL (ref 80–100)
MONOCYTES # BLD AUTO: 0.62 X10*3/UL (ref 0.1–1)
MONOCYTES NFR BLD AUTO: 8.8 %
NEUTROPHILS # BLD AUTO: 4.55 X10*3/UL (ref 1.2–7.7)
NEUTROPHILS NFR BLD AUTO: 64.9 %
NON HDL CHOLESTEROL: 148 MG/DL (ref 0–149)
NRBC BLD-RTO: 0 /100 WBCS (ref 0–0)
PLATELET # BLD AUTO: 278 X10*3/UL (ref 150–450)
POC APPEARANCE, URINE: ABNORMAL
POC BILIRUBIN, URINE: NEGATIVE
POC BLOOD, URINE: ABNORMAL
POC COLOR, URINE: YELLOW
POC GLUCOSE, URINE: NEGATIVE MG/DL
POC KETONES, URINE: NEGATIVE MG/DL
POC LEUKOCYTES, URINE: NEGATIVE
POC NITRITE,URINE: NEGATIVE
POC PH, URINE: 7 PH
POC PROTEIN, URINE: NEGATIVE MG/DL
POC SPECIFIC GRAVITY, URINE: 1.02
POC UROBILINOGEN, URINE: 0.2 EU/DL
POTASSIUM SERPL-SCNC: 5.2 MMOL/L (ref 3.5–5.3)
PROT SERPL-MCNC: 7.1 G/DL (ref 6.4–8.2)
PSA SERPL-MCNC: 1.08 NG/ML
RBC # BLD AUTO: 4.98 X10*6/UL (ref 4.5–5.9)
SODIUM SERPL-SCNC: 140 MMOL/L (ref 136–145)
TRIGL SERPL-MCNC: 87 MG/DL (ref 0–149)
TSH SERPL-ACNC: 2.17 MIU/L (ref 0.44–3.98)
URATE SERPL-MCNC: 6.7 MG/DL (ref 4–7.5)
VLDL: 17 MG/DL (ref 0–40)
WBC # BLD AUTO: 7 X10*3/UL (ref 4.4–11.3)

## 2024-10-16 PROCEDURE — 93000 ELECTROCARDIOGRAM COMPLETE: CPT | Performed by: FAMILY MEDICINE

## 2024-10-16 PROCEDURE — 36415 COLL VENOUS BLD VENIPUNCTURE: CPT

## 2024-10-16 PROCEDURE — 84550 ASSAY OF BLOOD/URIC ACID: CPT

## 2024-10-16 PROCEDURE — 84443 ASSAY THYROID STIM HORMONE: CPT

## 2024-10-16 PROCEDURE — 85025 COMPLETE CBC W/AUTO DIFF WBC: CPT

## 2024-10-16 PROCEDURE — 84153 ASSAY OF PSA TOTAL: CPT

## 2024-10-16 PROCEDURE — 99396 PREV VISIT EST AGE 40-64: CPT | Performed by: FAMILY MEDICINE

## 2024-10-16 PROCEDURE — 80053 COMPREHEN METABOLIC PANEL: CPT

## 2024-10-16 PROCEDURE — 3078F DIAST BP <80 MM HG: CPT | Performed by: FAMILY MEDICINE

## 2024-10-16 PROCEDURE — 81002 URINALYSIS NONAUTO W/O SCOPE: CPT | Performed by: FAMILY MEDICINE

## 2024-10-16 PROCEDURE — 99214 OFFICE O/P EST MOD 30 MIN: CPT | Performed by: FAMILY MEDICINE

## 2024-10-16 PROCEDURE — 71046 X-RAY EXAM CHEST 2 VIEWS: CPT | Performed by: RADIOLOGY

## 2024-10-16 PROCEDURE — 80061 LIPID PANEL: CPT

## 2024-10-16 PROCEDURE — 1036F TOBACCO NON-USER: CPT | Performed by: FAMILY MEDICINE

## 2024-10-16 PROCEDURE — 71046 X-RAY EXAM CHEST 2 VIEWS: CPT

## 2024-10-16 PROCEDURE — 3008F BODY MASS INDEX DOCD: CPT | Performed by: FAMILY MEDICINE

## 2024-10-16 PROCEDURE — 3074F SYST BP LT 130 MM HG: CPT | Performed by: FAMILY MEDICINE

## 2024-10-16 ASSESSMENT — PAIN SCALES - GENERAL: PAINLEVEL_OUTOF10: 0-NO PAIN

## 2024-10-16 NOTE — PROGRESS NOTES
"Subjective   Patient ID: Russel Black is a 58 y.o. male who presents for Annual Exam.    Here for CPE.    Patient has a history of hypertension, hyperlipidemia, GERD, COPD lung blebs with lobectomy, CHF, depression, pulmonary hypertension, prediabetes and gout.    Seeing a new GI   Feels like his throat is closing and difficulty breathing  GI feels lung related or cardiac related.     Recent visit with dermatologist for a thick pad on his foot  Had it scraped  Admits to cramps in his foot  Tried gout meds and no help.  Up at night for BR every 2 hours for several years.   Atypical chest pains    No dentist, dentures  Due for eye exam  Diet is poor, eats one meal a day  Not watching sugar, eating cookies daily  No exercise working in [employer]               Review of Systems    Objective   /72 (BP Location: Left arm, Patient Position: Sitting, BP Cuff Size: Adult)   Pulse 79   Temp 36.7 °C (98 °F) (Temporal)   Ht 1.702 m (5' 7\")   Wt 83 kg (183 lb)   SpO2 96%   BMI 28.66 kg/m²     Physical Exam  Vitals and nursing note reviewed.   Constitutional:       Appearance: Normal appearance.   HENT:      Head: Normocephalic.      Right Ear: Tympanic membrane and ear canal normal.      Left Ear: Tympanic membrane and ear canal normal.      Nose: Nose normal.      Mouth/Throat:      Mouth: Mucous membranes are moist.   Eyes:      Extraocular Movements: Extraocular movements intact.      Conjunctiva/sclera: Conjunctivae normal.      Pupils: Pupils are equal, round, and reactive to light.   Cardiovascular:      Rate and Rhythm: Normal rate and regular rhythm.      Pulses: Normal pulses.      Heart sounds: Normal heart sounds.   Pulmonary:      Effort: Pulmonary effort is normal.      Breath sounds: Normal breath sounds.   Musculoskeletal:         General: Normal range of motion.      Cervical back: Normal range of motion.   Skin:     General: Skin is warm and dry.   Neurological:      General: No focal deficit " present.      Mental Status: He is alert and oriented to person, place, and time.   Psychiatric:         Mood and Affect: Mood normal.         Behavior: Behavior normal.         Thought Content: Thought content normal.         Judgment: Judgment normal.         Assessment/Plan   Problem List Items Addressed This Visit             ICD-10-CM    Benign essential hypertension I10     Stable without meds.  Begin home blood pressure monitoring.  Referring back to cardiology.  Recheck in 3 months         Relevant Orders    CBC and Auto Differential    ECG 12 lead (Clinic Performed)    Diastolic heart failure I50.30     Referring back to cardiology for recheck          Gastroesophageal reflux disease with esophagitis K21.00     Uncontrolled  Reviewed GI note.  Beginning workup advised per GI         Gout M10.9     Checking labs and adjust meds accordingly         Relevant Orders    Uric acid    Mixed hyperlipidemia E78.2     Checking fasting lipid profile and adjust meds accordingly.  Reviewed diet.  Recheck in 3 months         Relevant Orders    Comprehensive Metabolic Panel    Lipid Panel    Pulmonary emphysema (Multi) J43.9     Checking PFTs.  Consider referral back to pulmonology         Pulmonary hypertension (Multi) I27.20    Relevant Orders    Referral to Cardiology    Pre-diabetes R73.03     Uncontrolled  Checking fasting sugar and treat accordingly         Annual physical exam - Primary Z00.00     Checking fasting labs.  Reviewed diet and exercise.  Declined immunizations.  EKG and urine normal         Relevant Orders    Prostate Specific Antigen     Other Visit Diagnoses         Codes    Depression, unspecified depression type     F32.A    Relevant Orders    TSH with reflex to Free T4 if abnormal    Urine frequency     R35.0    Relevant Orders    Referral to Urology    POCT UA (nonautomated) manually resulted (Completed)    Shortness of breath     R06.02    Relevant Orders    XR chest 2 views    Complete Pulmonary  Function Test (Spirometry/DLCO/Lung Volumes)    Oropharyngeal dysphagia     R13.12    Relevant Orders    FL GI esophagram

## 2024-10-16 NOTE — ASSESSMENT & PLAN NOTE
Checking fasting labs.  Reviewed diet and exercise.  Declined immunizations.  EKG and urine normal

## 2024-10-16 NOTE — ASSESSMENT & PLAN NOTE
Stable without meds.  Begin home blood pressure monitoring.  Referring back to cardiology.  Recheck in 3 months

## 2024-10-21 DIAGNOSIS — R73.03 PRE-DIABETES: Primary | ICD-10-CM

## 2024-10-21 LAB
EST. AVERAGE GLUCOSE BLD GHB EST-MCNC: 117 MG/DL
HBA1C MFR BLD: 5.7 %

## 2025-08-05 ENCOUNTER — APPOINTMENT (OUTPATIENT)
Dept: PRIMARY CARE | Facility: CLINIC | Age: 60
End: 2025-08-05
Payer: COMMERCIAL

## 2025-08-05 VITALS
WEIGHT: 186.6 LBS | OXYGEN SATURATION: 95 % | DIASTOLIC BLOOD PRESSURE: 78 MMHG | HEIGHT: 67 IN | HEART RATE: 66 BPM | SYSTOLIC BLOOD PRESSURE: 115 MMHG | BODY MASS INDEX: 29.29 KG/M2

## 2025-08-05 DIAGNOSIS — R73.03 PRE-DIABETES: ICD-10-CM

## 2025-08-05 DIAGNOSIS — E78.2 MIXED HYPERLIPIDEMIA: ICD-10-CM

## 2025-08-05 DIAGNOSIS — E55.9 VITAMIN D DEFICIENCY: ICD-10-CM

## 2025-08-05 DIAGNOSIS — I27.20 PULMONARY HYPERTENSION (MULTI): ICD-10-CM

## 2025-08-05 DIAGNOSIS — M51.369 DEGENERATION OF INTERVERTEBRAL DISC OF LUMBAR REGION, UNSPECIFIED WHETHER PAIN PRESENT: ICD-10-CM

## 2025-08-05 DIAGNOSIS — I50.32 CHRONIC DIASTOLIC HEART FAILURE: ICD-10-CM

## 2025-08-05 DIAGNOSIS — Z77.011 LEAD EXPOSURE: ICD-10-CM

## 2025-08-05 DIAGNOSIS — Z00.00 ANNUAL PHYSICAL EXAM: Primary | ICD-10-CM

## 2025-08-05 DIAGNOSIS — D51.0 PERNICIOUS ANEMIA: ICD-10-CM

## 2025-08-05 DIAGNOSIS — I10 BENIGN ESSENTIAL HYPERTENSION: ICD-10-CM

## 2025-08-05 DIAGNOSIS — K21.00 GASTROESOPHAGEAL REFLUX DISEASE WITH ESOPHAGITIS WITHOUT HEMORRHAGE: ICD-10-CM

## 2025-08-05 DIAGNOSIS — G83.10 PARESIS OF SINGLE LOWER EXTREMITY: ICD-10-CM

## 2025-08-05 DIAGNOSIS — M1A.0790 IDIOPATHIC CHRONIC GOUT OF FOOT WITHOUT TOPHUS, UNSPECIFIED LATERALITY: ICD-10-CM

## 2025-08-05 DIAGNOSIS — J43.1 PANLOBULAR EMPHYSEMA (MULTI): ICD-10-CM

## 2025-08-05 PROBLEM — S29.011A MUSCLE STRAIN OF CHEST WALL: Status: RESOLVED | Noted: 2025-08-05 | Resolved: 2025-08-05

## 2025-08-05 PROBLEM — M25.551 RIGHT HIP PAIN: Status: RESOLVED | Noted: 2025-08-05 | Resolved: 2025-08-05

## 2025-08-05 PROBLEM — G56.21: Status: ACTIVE | Noted: 2025-08-05

## 2025-08-05 PROBLEM — M54.12 CERVICAL RADICULAR PAIN: Status: ACTIVE | Noted: 2025-08-05

## 2025-08-05 PROBLEM — R06.02 SHORT OF BREATH ON EXERTION: Status: ACTIVE | Noted: 2025-08-05

## 2025-08-05 PROBLEM — L30.9 ECZEMA: Status: ACTIVE | Noted: 2025-08-05

## 2025-08-05 PROBLEM — J01.10 ACUTE FRONTAL SINUSITIS: Status: RESOLVED | Noted: 2025-08-05 | Resolved: 2025-08-05

## 2025-08-05 PROBLEM — R20.0 NUMBNESS: Status: ACTIVE | Noted: 2025-08-05

## 2025-08-05 PROBLEM — M54.16 LUMBAR RADICULOPATHY: Status: ACTIVE | Noted: 2025-08-05

## 2025-08-05 PROBLEM — R60.0 EDEMA OF LOWER EXTREMITY: Status: RESOLVED | Noted: 2025-08-05 | Resolved: 2025-08-05

## 2025-08-05 RX ORDER — IBUPROFEN 800 MG/1
1 TABLET, FILM COATED ORAL
COMMUNITY
Start: 2024-11-07

## 2025-08-05 RX ORDER — BUDESONIDE AND FORMOTEROL FUMARATE DIHYDRATE 80; 4.5 UG/1; UG/1
2 AEROSOL RESPIRATORY (INHALATION) 2 TIMES DAILY
Qty: 10.2 G | Refills: 2 | Status: SHIPPED | OUTPATIENT
Start: 2025-08-05

## 2025-08-05 RX ORDER — ATORVASTATIN CALCIUM 40 MG/1
40 TABLET, FILM COATED ORAL DAILY
Qty: 90 TABLET | Refills: 1 | Status: SHIPPED | OUTPATIENT
Start: 2025-08-05

## 2025-08-05 RX ORDER — ALLOPURINOL 100 MG/1
100 TABLET ORAL DAILY
Qty: 30 TABLET | Refills: 0 | Status: SHIPPED | OUTPATIENT
Start: 2025-08-05

## 2025-08-05 RX ORDER — ALBUTEROL SULFATE 90 UG/1
1 INHALANT RESPIRATORY (INHALATION) EVERY 4 HOURS PRN
Qty: 18 G | Refills: 2 | Status: SHIPPED | OUTPATIENT
Start: 2025-08-05

## 2025-08-05 RX ORDER — PANTOPRAZOLE SODIUM 40 MG/1
40 TABLET, DELAYED RELEASE ORAL DAILY
Qty: 30 TABLET | Refills: 0 | Status: SHIPPED | OUTPATIENT
Start: 2025-08-05

## 2025-08-05 NOTE — ASSESSMENT & PLAN NOTE
Patient with a history of panlobular emphysema not currently using his Symbicort daily.  Has daily dyspnea with exertion.  He has to walk a lot for work  Can give him some restrictions to try to reduce walking or possible.  Will refer to pulmonology and have him get further PFTs.  Refilled albuterol and Symbicort and educated on taking Symbicort daily  Patient will need to follow-up in at least 3 months, sooner if needed  Orders:    Referral to Pulmonology; Future    albuterol 90 mcg/actuation inhaler; Inhale 1 puff every 4 hours if needed for wheezing or shortness of breath.    budesonide-formoterol (Symbicort) 80-4.5 mcg/actuation inhaler; Inhale 2 puffs 2 times a day. Rinse mouth after use.
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
## 2025-08-05 NOTE — ASSESSMENT & PLAN NOTE
Patient with history of hypertension that is currently well-controlled without any medications.  No additional medications at this time and will continue to monitor.

## 2025-08-05 NOTE — ASSESSMENT & PLAN NOTE
Patient with history of GERD refractory to PPI.  Currently working with GI needing endoscopy.  Has a history of hiatal hernia.  He cannot get an endoscopy until he is evaluated by pulmonology and cardiology.  Orders:    Referral to Gastroenterology; Future    pantoprazole (ProtoNix) 40 mg EC tablet; Take 1 tablet (40 mg) by mouth once daily. DO NOT CRUSH CHEW OR SPLIT

## 2025-08-05 NOTE — ASSESSMENT & PLAN NOTE
Patient with a listed diagnosis of chronic diastolic heart failure.  Patient needs a follow-up with cardiology prior to continuing for endoscopy.  May need stress testing versus echocardiogram, appreciate cardiology's input  Orders:    Referral to Cardiology; Future

## 2025-08-05 NOTE — ASSESSMENT & PLAN NOTE
History of hyperlipidemia not currently taking his statin. Refill statin and [facility] on daily use.    Orders:    Lipid panel; Future    atorvastatin (Lipitor) 40 mg tablet; Take 1 tablet (40 mg) by mouth once daily. as directed

## 2025-08-05 NOTE — ASSESSMENT & PLAN NOTE
Reviewed Social Determinants of health with patient, discussed healthy lifestyle including 150 minutes of physical activity per week  Ordered/Reviewed baseline labwork -CBC, CMP, Lipid Panel, uric acid, A1c,   Immunizations: Tdap and Shingrix due today  Follow Up: 3 months or sooner pending testing   Orders:    CBC and Auto Differential; Future    Comprehensive metabolic panel; Future    Uric acid; Future    Hemoglobin A1c; Future    Vitamin D 25-Hydroxy,Total (for eval of Vitamin D levels); Future    Tdap vaccine, age 7 years and older  (BOOSTRIX)    Zoster vaccine, recombinant, adult (SHINGRIX)

## 2025-08-05 NOTE — LETTER
August 5, 2025     Patient: Russel Black   YOB: 1965   Date of Visit: 8/5/2025       To Whom It May Concern:    It is my medical opinion that Russel Black may return to light duty immediately with the following restrictions: Please limit walking when able and allow reasonable accommodations to reduce walking long distances..    If you have any questions or concerns, please don't hesitate to call.         Sincerely,        Pepe Jean,     CC: No Recipients

## 2025-08-05 NOTE — ASSESSMENT & PLAN NOTE
History of degenerative disc disease of the lumbar spine with recent exacerbation declining muscle relaxers and not wanting to use NSAIDs due to reflux.  Will manage conservatively for now with plan for reevaluation if not improving over the next several weeks.

## 2025-08-05 NOTE — ASSESSMENT & PLAN NOTE
Patient with history of gout usually occurring in his bilateral feet with no recent exacerbations.  Not currently taking allopurinol daily, will check uric acid and educated on taking allopurinol.  Orders:    Uric acid; Future    Lead, blood; Future    allopurinol (Zyloprim) 100 mg tablet; Take 1 tablet (100 mg) by mouth once daily.

## 2025-08-05 NOTE — ASSESSMENT & PLAN NOTE
Patient with a history listed of pulmonary hypertension.  Will refer to both cardiology and pulmonology.  He is currently working with GI to get an endoscopy for refractory GERD and he needs to see pulmonology and cardiology prior to endoscopy.  Orders:    Referral to Cardiology; Future    Referral to Pulmonology; Future

## 2025-08-05 NOTE — ASSESSMENT & PLAN NOTE
History of pernicious anemia not taking supplementation, lab work ordered  Orders:    Vitamin B12; Future

## 2025-08-05 NOTE — ASSESSMENT & PLAN NOTE
Patient with a listed history of prediabetes, will check an A1c given high sugar intake.  Orders:    Hemoglobin A1c; Future

## 2025-08-05 NOTE — PATIENT INSTRUCTIONS
Follow up with Cardiology  Follow up With Pulmonology  Return to GI doctor for scope after seeing them  Start your Symbicort everyday and use albuterol as needed

## 2025-08-05 NOTE — ASSESSMENT & PLAN NOTE
Patient with a listed history of vitamin D deficiency not currently taking vitamin D.  Will recheck vitamin D level and assess need for medication.    Orders:    Vitamin D 25-Hydroxy,Total (for eval of Vitamin D levels); Future

## 2025-08-06 LAB
25(OH)D3+25(OH)D2 SERPL-MCNC: 23 NG/ML (ref 30–100)
ALBUMIN SERPL-MCNC: 4.4 G/DL (ref 3.6–5.1)
ALP SERPL-CCNC: 64 U/L (ref 35–144)
ALT SERPL-CCNC: 13 U/L (ref 9–46)
ANION GAP SERPL CALCULATED.4IONS-SCNC: 11 MMOL/L (CALC) (ref 7–17)
AST SERPL-CCNC: 22 U/L (ref 10–35)
BASOPHILS # BLD AUTO: 50 CELLS/UL (ref 0–200)
BASOPHILS NFR BLD AUTO: 0.7 %
BILIRUB SERPL-MCNC: 0.8 MG/DL (ref 0.2–1.2)
BUN SERPL-MCNC: 12 MG/DL (ref 7–25)
CALCIUM SERPL-MCNC: 9.3 MG/DL (ref 8.6–10.3)
CHLORIDE SERPL-SCNC: 106 MMOL/L (ref 98–110)
CHOLEST SERPL-MCNC: 207 MG/DL
CHOLEST/HDLC SERPL: 4.7 (CALC)
CO2 SERPL-SCNC: 22 MMOL/L (ref 20–32)
CREAT SERPL-MCNC: 1 MG/DL (ref 0.7–1.3)
EGFRCR SERPLBLD CKD-EPI 2021: 88 ML/MIN/1.73M2
EOSINOPHIL # BLD AUTO: 341 CELLS/UL (ref 15–500)
EOSINOPHIL NFR BLD AUTO: 4.8 %
ERYTHROCYTE [DISTWIDTH] IN BLOOD BY AUTOMATED COUNT: 13.2 % (ref 11–15)
EST. AVERAGE GLUCOSE BLD GHB EST-MCNC: 131 MG/DL
EST. AVERAGE GLUCOSE BLD GHB EST-SCNC: 7.3 MMOL/L
GLUCOSE SERPL-MCNC: 106 MG/DL (ref 65–99)
HBA1C MFR BLD: 6.2 %
HCT VFR BLD AUTO: 42.7 % (ref 38.5–50)
HDLC SERPL-MCNC: 44 MG/DL
HGB BLD-MCNC: 13.9 G/DL (ref 13.2–17.1)
LDLC SERPL CALC-MCNC: 144 MG/DL (CALC)
LEAD BLDV-MCNC: <1 MCG/DL
LYMPHOCYTES # BLD AUTO: 2080 CELLS/UL (ref 850–3900)
LYMPHOCYTES NFR BLD AUTO: 29.3 %
MCH RBC QN AUTO: 28.1 PG (ref 27–33)
MCHC RBC AUTO-ENTMCNC: 32.6 G/DL (ref 32–36)
MCV RBC AUTO: 86.4 FL (ref 80–100)
MONOCYTES # BLD AUTO: 490 CELLS/UL (ref 200–950)
MONOCYTES NFR BLD AUTO: 6.9 %
NEUTROPHILS # BLD AUTO: 4139 CELLS/UL (ref 1500–7800)
NEUTROPHILS NFR BLD AUTO: 58.3 %
NONHDLC SERPL-MCNC: 163 MG/DL (CALC)
PLATELET # BLD AUTO: 279 THOUSAND/UL (ref 140–400)
PMV BLD REES-ECKER: 10.4 FL (ref 7.5–12.5)
POTASSIUM SERPL-SCNC: 4.7 MMOL/L (ref 3.5–5.3)
PROT SERPL-MCNC: 7.5 G/DL (ref 6.1–8.1)
RBC # BLD AUTO: 4.94 MILLION/UL (ref 4.2–5.8)
SODIUM SERPL-SCNC: 139 MMOL/L (ref 135–146)
TRIGL SERPL-MCNC: 85 MG/DL
URATE SERPL-MCNC: 7.4 MG/DL (ref 4–8)
VIT B12 SERPL-MCNC: 230 PG/ML (ref 200–1100)
WBC # BLD AUTO: 7.1 THOUSAND/UL (ref 3.8–10.8)

## 2025-08-11 ENCOUNTER — TELEPHONE (OUTPATIENT)
Dept: PRIMARY CARE | Facility: CLINIC | Age: 60
End: 2025-08-11
Payer: COMMERCIAL

## 2025-10-09 ENCOUNTER — APPOINTMENT (OUTPATIENT)
Dept: CARDIOLOGY | Facility: HOSPITAL | Age: 60
End: 2025-10-09
Payer: COMMERCIAL

## 2025-11-04 ENCOUNTER — APPOINTMENT (OUTPATIENT)
Dept: PRIMARY CARE | Facility: CLINIC | Age: 60
End: 2025-11-04
Payer: COMMERCIAL